# Patient Record
Sex: FEMALE | Race: WHITE | NOT HISPANIC OR LATINO | Employment: OTHER | URBAN - METROPOLITAN AREA
[De-identification: names, ages, dates, MRNs, and addresses within clinical notes are randomized per-mention and may not be internally consistent; named-entity substitution may affect disease eponyms.]

---

## 2023-07-26 ENCOUNTER — LAB REQUISITION (OUTPATIENT)
Dept: LAB | Facility: CLINIC | Age: 88
End: 2023-07-26

## 2023-07-26 DIAGNOSIS — E11.9 TYPE 2 DIABETES MELLITUS WITHOUT COMPLICATIONS (H): ICD-10-CM

## 2023-07-27 LAB
ANION GAP SERPL CALCULATED.3IONS-SCNC: 9 MMOL/L (ref 7–15)
BUN SERPL-MCNC: 11.7 MG/DL (ref 8–23)
CALCIUM SERPL-MCNC: 9 MG/DL (ref 8.2–9.6)
CHLORIDE SERPL-SCNC: 102 MMOL/L (ref 98–107)
CREAT SERPL-MCNC: 0.81 MG/DL (ref 0.51–0.95)
DEPRECATED HCO3 PLAS-SCNC: 28 MMOL/L (ref 22–29)
GFR SERPL CREATININE-BSD FRML MDRD: 68 ML/MIN/1.73M2
GLUCOSE SERPL-MCNC: 143 MG/DL (ref 70–99)
HBA1C MFR BLD: 8 %
POTASSIUM SERPL-SCNC: 4.7 MMOL/L (ref 3.4–5.3)
SODIUM SERPL-SCNC: 139 MMOL/L (ref 136–145)

## 2023-07-27 PROCEDURE — 83036 HEMOGLOBIN GLYCOSYLATED A1C: CPT | Performed by: FAMILY MEDICINE

## 2023-07-27 PROCEDURE — 82310 ASSAY OF CALCIUM: CPT | Performed by: FAMILY MEDICINE

## 2023-07-27 PROCEDURE — 36415 COLL VENOUS BLD VENIPUNCTURE: CPT | Performed by: FAMILY MEDICINE

## 2023-07-27 PROCEDURE — P9603 ONE-WAY ALLOW PRORATED MILES: HCPCS | Performed by: FAMILY MEDICINE

## 2024-07-16 ENCOUNTER — DOCUMENTATION ONLY (OUTPATIENT)
Dept: OTHER | Facility: CLINIC | Age: 89
End: 2024-07-16
Payer: MEDICARE

## 2024-07-17 ENCOUNTER — DOCUMENTATION ONLY (OUTPATIENT)
Dept: GERIATRICS | Facility: CLINIC | Age: 89
End: 2024-07-17
Payer: MEDICARE

## 2024-07-23 ENCOUNTER — ASSISTED LIVING VISIT (OUTPATIENT)
Dept: GERIATRICS | Facility: CLINIC | Age: 89
End: 2024-07-23
Payer: MEDICARE

## 2024-07-23 VITALS
TEMPERATURE: 96.8 F | HEART RATE: 76 BPM | WEIGHT: 110 LBS | DIASTOLIC BLOOD PRESSURE: 60 MMHG | OXYGEN SATURATION: 95 % | RESPIRATION RATE: 18 BRPM | SYSTOLIC BLOOD PRESSURE: 135 MMHG

## 2024-07-23 DIAGNOSIS — R60.0 PERIPHERAL EDEMA: ICD-10-CM

## 2024-07-23 DIAGNOSIS — E11.319 TYPE 2 DIABETES MELLITUS WITH RETINOPATHY, WITHOUT LONG-TERM CURRENT USE OF INSULIN, MACULAR EDEMA PRESENCE UNSPECIFIED, UNSPECIFIED LATERALITY, UNSPECIFIED RETINOPATHY SEVERITY (H): ICD-10-CM

## 2024-07-23 DIAGNOSIS — I50.32 CHRONIC DIASTOLIC CONGESTIVE HEART FAILURE (H): Primary | ICD-10-CM

## 2024-07-23 DIAGNOSIS — L89.312 PRESSURE INJURY OF RIGHT BUTTOCK, STAGE 2 (H): ICD-10-CM

## 2024-07-23 DIAGNOSIS — F22 DELUSIONAL DISORDERS (H): ICD-10-CM

## 2024-07-23 DIAGNOSIS — R23.9 ALTERATION IN SKIN INTEGRITY DUE TO MOISTURE: ICD-10-CM

## 2024-07-23 DIAGNOSIS — F03.918 DEMENTIA WITH BEHAVIORAL DISTURBANCE (H): ICD-10-CM

## 2024-07-23 DIAGNOSIS — R52 GENERALIZED PAIN: ICD-10-CM

## 2024-07-23 DIAGNOSIS — H91.90 PROFOUND HEARING LOSS: ICD-10-CM

## 2024-07-23 DIAGNOSIS — I25.85 CHRONIC CORONARY MICROVASCULAR DYSFUNCTION: ICD-10-CM

## 2024-07-23 DIAGNOSIS — K59.01 SLOW TRANSIT CONSTIPATION: ICD-10-CM

## 2024-07-23 DIAGNOSIS — R29.818 NEUROLOGICAL DEFICIT PRESENT: ICD-10-CM

## 2024-07-23 DIAGNOSIS — E55.9 VITAMIN D3 DEFICIENCY: ICD-10-CM

## 2024-07-23 PROCEDURE — 99344 HOME/RES VST NEW MOD MDM 60: CPT | Performed by: NURSE PRACTITIONER

## 2024-07-23 RX ORDER — FUROSEMIDE 20 MG
20 TABLET ORAL DAILY
COMMUNITY
Start: 2024-04-30 | End: 2024-07-23

## 2024-07-23 RX ORDER — POLYETHYLENE GLYCOL 3350 17 G/17G
17 POWDER, FOR SOLUTION ORAL PRN
Status: SHIPPED
Start: 2024-07-23 | End: 2024-07-23

## 2024-07-23 RX ORDER — POLYETHYLENE GLYCOL 3350 17 G/17G
17 POWDER, FOR SOLUTION ORAL PRN
COMMUNITY
Start: 2024-06-04 | End: 2024-07-23

## 2024-07-23 RX ORDER — RISPERIDONE 1 MG/1
1 TABLET, ORALLY DISINTEGRATING ORAL AT BEDTIME
COMMUNITY
Start: 2024-07-10

## 2024-07-23 RX ORDER — CHOLECALCIFEROL (VITAMIN D3) 50 MCG
1 TABLET ORAL DAILY
Qty: 30 TABLET | Refills: 11 | Status: SHIPPED | OUTPATIENT
Start: 2024-07-23 | End: 2024-07-29

## 2024-07-23 RX ORDER — M-VIT,TX,IRON,MINS/CALC/FOLIC 27MG-0.4MG
1 TABLET ORAL DAILY
COMMUNITY
Start: 2024-04-30

## 2024-07-23 RX ORDER — FUROSEMIDE 20 MG
20 TABLET ORAL DAILY PRN
Status: SHIPPED
Start: 2024-06-04 | End: 2024-07-23

## 2024-07-23 RX ORDER — ASPIRIN 81 MG/1
81 TABLET, CHEWABLE ORAL DAILY
COMMUNITY
Start: 2024-04-30

## 2024-07-23 RX ORDER — ACETAMINOPHEN 500 MG
1000 TABLET ORAL PRN
COMMUNITY
Start: 2024-04-30 | End: 2024-07-23

## 2024-07-23 RX ORDER — AMOXICILLIN 250 MG
1 CAPSULE ORAL DAILY PRN
COMMUNITY
Start: 2024-06-04

## 2024-07-23 RX ORDER — OLANZAPINE 5 MG/1
1 TABLET, ORALLY DISINTEGRATING ORAL EVERY 6 HOURS PRN
COMMUNITY
Start: 2024-04-30

## 2024-07-23 RX ORDER — ACETAMINOPHEN 500 MG
1000 TABLET ORAL EVERY 6 HOURS PRN
Status: SHIPPED
Start: 2024-05-10

## 2024-07-23 RX ORDER — TRAZODONE HYDROCHLORIDE 50 MG/1
12.5 TABLET, FILM COATED ORAL PRN
COMMUNITY
Start: 2024-04-30

## 2024-07-23 RX ORDER — POLYETHYLENE GLYCOL 3350 17 G/17G
1 POWDER, FOR SOLUTION ORAL DAILY PRN
Status: SHIPPED
Start: 2024-06-04

## 2024-07-23 NOTE — PROGRESS NOTES
[facility] GERIATRICS  INITIAL VISIT NOTE  July 23, 2024      PRIMARY CARE PROVIDER AND CLINIC:  Magalis Prince [address] [facility] [address] [facility] [place] [address]    [facility] Medical Record Number:  [id number]  Place of Service where encounter took place:  [facility]) [[id number]]    Chief Complaint   Patient presents with    Establish Care       HPI:    Carmela Freeman is a 89 year old  (4/25/1932) female was admitted to the above facility from   Home .  Most recent ED visit was on 5/12/24 for community acquired pneumonia.  Now resides at this facility for  medical management and nursing care.      History obtained from: facility chart records, facility staff, patient report, [facility] chart review, Care Everywhere Epic chart review, and family/first contact 2 of daughter's report.      Carmela is a 89 year old female with chronic health issues that include:  Type 2 DM with eye complications, chronic coronary microvascular dysfunction, GERD, Paranoia/Delusions, Profound hearing loss, Dementia with behavioral disturbances.    Carmela resides in [facility] since March of 2024.  Prior to that she lived in a house in [place] for 40+ years.   and has 6 children scattered around the [place].  Family made decision to turn over her primary care to in house provider as Carmela has been showing a decline in her health over last few weeks and hard to get her to be seen in a clinic.    Typically her primary provider was through [facility] and Carmela use to go to [facility].  Now she would go to [facility].      Meeting with Carmela for the first time today.  One of her daughter's from [facility] just came yesterday to spend time with her mother.  She states she can see a decline since being in [place] since April 2024.      What is most noticeable is that Carmela's left hand is folded in like it is contracted.  Does not hurt her until attempting to unfold the  "fingers.  Either bad arthritis or from a possible stroke that at this time in unknown.    Through this time, family and facility have added more services to her plan for her safety and need for being able to help with personal cares.  Has a 2WW that she has been noted to have since it is mentioned in the ED visit notes from May.  She was having weak knee and so they going to provider with her a walker.  Now with her left hand not able to grasp well, harder for her to walk with the walker.  This NP had given an order last week to initiate Home Care RN/OT/PT for mobility, exercises, muscle weakness, and skin alteration  on her buttocks.     Today the daughter assisted Carmela to a standing position while Carmela held onto the walker and this NP looked at her bottom.  Once done, then Carmela sat back down in the recliner.  Family state that Carmela has been sleeping in the recliner and so movement limited and would have a tendency to \"not have to go to the restroom\" if it was a male aide checking on her.  Family got Carmela a new adjustable bed and she actually slept for over 12 hours last night in bed.  The daughter present today thought her mom looked more rested and animated due to getting more sleep.      Carmela has profound hearing loss and the contact daughter states Carmela does not want to hear it is profound.  Have attempted different hearing aides, but something was not to Carmela's liking and so not used.  Needs to be spoken to clearly and near her.  She was able to answer 90% of the questions herself today for collecting history of self and family.  Attempted to tell her a lot today and so know that she probably was overwhelmed.    Carmela is a beautiful  by [employer].  Has pictures all around her apartment she painted.  Family hopes after all this, she will get back to painting.      After leaving the facility today, did call the primary contact daughter to introduce self, answer questions, ask questions, and get " family's thoughts on what they would want to do for Carmela and what Carmela would tolerate.          CODE STATUS/ADVANCE DIRECTIVES: DNR / DNI    ALLERGIES:  Allergies   Allergen Reactions    Atorvastatin      PN: LW Reaction: myalgias       PAST MEDICAL HISTORY:   Past Medical History:   Diagnosis Date    Acute coronary syndrome (H)     Diabetes mellitus, type 2 (H)     Gastroesophageal reflux disease     Hyperlipidemia     Paranoia (H)     Patellar tracking disorder of right knee        PAST SURGICAL HISTORY:   Past Surgical History:   Procedure Laterality Date    APPENDECTOMY      CATARACT EXTRACTION Bilateral     not done at the same time    fractured lower leg Right     adrianna placement    GALLBLADDER SURGERY      ruptured and cleaned inside of abdomen.  no colostomy       FAMILY HISTORY:   Family History   Problem Relation Age of Onset    Atrial fibrillation Mother     Pneumonia Father     Cancer Sister     Diabetes Daughter     Uterine Cancer Daughter     Leukemia Daughter        SOCIAL HISTORY:   Patient's living condition: lives in an assisted living facility    MEDICATIONS  Post Discharge Medication Reconciliation Status: patient was not discharged from an inpatient facility or TCU.  Current Outpatient Medications   Medication Sig Dispense Refill    acetaminophen (TYLENOL) 500 MG tablet Take 2 tablets (1,000 mg) by mouth every 6 hours as needed for fever or pain      aspirin (ASA) 81 MG chewable tablet Take 81 mg by mouth daily      OLANZapine zydis (ZYPREXA) 5 MG ODT Take 1 tablet by mouth every 6 hours as needed      polyethylene glycol (MIRALAX) 17 GM/Dose powder Take 17 g (1 Capful) by mouth daily as needed for constipation      risperiDONE (RISPERDAL M-TABS) 1 MG ODT Take 1 mg by mouth at bedtime      senna-docusate (SENOKOT-S/PERICOLACE) 8.6-50 MG tablet Take 1 tablet by mouth daily as needed      Therapeutic Multivit/Mineral tablet Take 1 tablet by mouth daily      traZODone (DESYREL) 50 MG tablet Take  "12.5 mg by mouth as needed      vitamin D3 (CHOLECALCIFEROL) 50 mcg (2000 units) tablet Take 1 tablet (50 mcg) by mouth daily 30 tablet 11    Barberry-Oreg Grape-Goldenseal 200-200-50 MG CAPS Take 1 capsule by mouth 2 times daily         ROS:  10 point ROS neg other than the symptoms noted above in the HPI.  Bowels moving, no pain with urination, no chest pain or SOB      PHYSICAL EXAM:  /60   Pulse 76   Temp 96.8  F (36  C)   Resp 18   Wt 49.9 kg (110 lb)   SpO2 95%   Physical Exam  Vitals and nursing note reviewed.   Constitutional:       Appearance: Normal appearance.      Comments: Thin has she has been losing weight with her altered diet.   HENT:      Head: Normocephalic and atraumatic.      Right Ear: External ear normal.      Left Ear: External ear normal.      Ears:      Comments: Profound hearing lose.  Speak by her and in clear slow voice     Nose: Nose normal.      Mouth/Throat:      Mouth: Mucous membranes are moist.      Pharynx: Oropharynx is clear.      Comments: Teeth in poor repair - eats pureed food that daughter makes for her.  Does not go downstairs for meals.    Eyes:      Extraocular Movements: Extraocular movements intact.      Conjunctiva/sclera: Conjunctivae normal.      Comments: No glasses worn.  She was able to tell this NP where to look for her paintings in her home.     Cardiovascular:      Rate and Rhythm: Normal rate and regular rhythm.      Heart sounds: Normal heart sounds.   Pulmonary:      Effort: Pulmonary effort is normal.      Breath sounds: Normal breath sounds.   Abdominal:      General: Abdomen is flat. Bowel sounds are normal.      Palpations: Abdomen is soft.   Genitourinary:     Comments: Can be incontinent if she says \"No\" when a male aide asks her if she needs to go or assist to restroom.  Poor hygiene in her rectal area.  Musculoskeletal:      Cervical back: Normal range of motion and neck supple.      Comments: Assist from daughter to hold her hands and " help her stand to walker.  Shaky.    No edema.    Left hand appears to be contracted.  Not able to fold out hand or it hurts.  Right hand normal   Skin:     General: Skin is warm and dry.      Coloration: Skin is pale.      Comments: Able to look at her bottom with help of the daughter to stand Carmela up.  On her right buttock there is approx. Nickel size stage 2 open area with a red base.  Does not appear to be infected.  Surround the same area on both buttocks is skin excoriation from friction and moisture.    Poor cleaning of self in rectal area.   Neurological:      Mental Status: She is alert.      Comments: Question left hand abnormality to be neurologic?  Oriented to place, person and time can be vague.  Did well with questions if she understood what was being said.   Psychiatric:      Comments: Tired but demeanor may be flat.    Hx of talking to self.    Has had 2 psych hospital stays at [facility] and [facility]      LABORATORY/IMAGING DATA:  Reviewed as per [facility] and/or [facility]    Vitamin D, 25-OH, Total  30 - 80 ng/mL 94 High      Lab Results   Component Value Date    A1C 8.0 07/27/2023     Component  Ref Range & Units 3 mo ago   Hemoglobin A1C  <=5.6 % 7.2 High    Estimated Average Glucose (Calc)  < 117 mg/dL 160     ASSESSMENT/PLAN:  (I50.32) Chronic diastolic congestive heart failure (H)  (primary encounter diagnosis)  (R60.0) Peripheral edema  Comment: currently is taking Lasix 20mg daily for the edema.  Minimal edema if any present today.  Lungs clear.  Family worries that she does not drink enough.  Next Monday will have a CMP done to check hydration.  No change with the Lasix dose today.  Plan: DISCONTINUED: furosemide (LASIX) 20 MG tablet    (I25.85) Chronic coronary microvascular dysfunction  Comment: possible acute coronary syndrome but saw an image of her heart showing thick dense arterial calcifications dated 7/13/23.    Remains on ASA 81mg daily.  No statin.  She is over 89 years old and so  most likely would not be effective.    Will collect a CBC next Monday.  Has had a TSH drawn 3 months ago and normal.    (R29.818) Neurological deficit present  Comment: Spoke with Carmela's primary contact daughter in regards to her left hand appearance.  Simple things would be osteoarthritis or a neurological event occurred but has not been evaluated for it at this time.  Minimally will just get a x-ray to make sure there is no fractures in her hand due to anticipation of therapies working with her in the future.  Did see she had a wrist splint laying on a table near her, but did not ask about it.  Did speak about possibly getting a CT scan to see if there is a history of having a stroke that may have caused her hand to crawl like it did.  Family is not so sure they would put her through that due to her mental illness.  Would leave it up to them to decide and could easily set up a scan over at [facility].  Daughter also stated that historically she has had issues with her hands where they would curl like that and then she would have to go in and get treatment to release the tension and then it would go back to normal.  Family is not so sure that is the issue at this time, but can address it in the future if they would want to take her over to orthopedics    (E11.319) Type 2 diabetes mellitus with retinopathy, without long-term current use of insulin, macular edema presence unspecified, unspecified laterality, unspecified retinopathy severity (H)  Comment: Carmela does not have any medications at this time for diabetes.  No blood sugar checks.  Will order A1c for next Monday since it was 3 to 4 months ago that she had a check.    (F22) Delusional disorders (H)  (F03.918) Dementia with behavioral disturbance (H)  Comment: Is under the care of a psychiatrist through [facility].  Have been slowly taking her down off of the scheduled risperidone.  Currently she is at 1 mg at bedtime.  Has as needed olanzapine  available for psychotic behaviors.  Daughter stated that Carmela at 1 point would be talking to herself.  Thinking that it had something to do with her hearing aids as well.  She did not like to hear her surroundings, and so does not wear her hearing aids.  Historically she would also have paranoid thoughts.  Has had 2 psych hospitalizations.  [facility] in [facility] a few years ago.  In speaking with the daughter this evening, Carmela is on the waiting list to go down into memory care where she will get more assistance and they would be able to accommodate her diet which is a puréed texture.  For now facility and family have increased her services for her safety.    (L89.312) Pressure injury of right buttock, stage 2 (H)  (R23.9) Alteration in skin integrity due to moisture  Comment: Today gave an order for Calmoseptine ointment twice a day and then twice a day as needed to be applied to her buttocks over the excoriated area and the open area.  Home care referral has already been put in place on an RN to evaluate and treat.  Did express to the nurses that Carmela needs more assistance with [employer] and cleanliness.  Now that she is laying in the bed, hopefully she can offload some pressure to her buttocks.    (H91.90) Profound hearing loss  Comment: Her hearing is a big barrier to her functioning in the environment.  Family states that because of this she does self isolate.  Informed them when she does get to go down to memory care, she would be able to stay in her room in between meals or try to participate in the activities visually.  Carmela did very well today and answering questions as far as family history her past surgeries and in general about her family.  She has a history of having multiple hearing aids which not for panned out to her liking.  No hearing aids are worn at this time.    (R52) Generalized pain  Comment: Just updating her epic medication list to reflect that she does have Tylenol extra  strength 1000 mg every 6 hours as needed.  Overall she states she has no pain at rest.  Plan: acetaminophen (TYLENOL) 500 MG tablet    (K59.01) Slow transit constipation  Comment: Carmela does not complain of any constipation but did make her aware that she does have senna S and MiraLAX on her list that she can ask for if she has any problems  Plan: polyethylene glycol (MIRALAX) 17 GM/Dose         powder, DISCONTINUED: polyethylene glycol         (MIRALAX) 17 GM/Dose powder    (E55.9) Vitamin D3 deficiency  Comment: Searched up vitamin D lab to see if any person ever ordered this lab.  Did found the last one that was taken fairly recent show that her reading was in the 90s which could lead to toxicity.  Carmela receives 5000 units on a daily basis.  Sent a new prescription to the pharmacy to reduce her vitamin D to 2000 units daily.  Next Monday will have vitamin D level drawn for comparison to her previous lab value.  Plan: vitamin D3 (CHOLECALCIFEROL) 50 mcg (2000         units) tablet     Orders:    Discontinue Vitamin D3 5000 units daily due to toxic level with last blood draw  Start Vitamin D3 2000 units po daily for vitamin D def.  X-ray of left hand, 2 views due to contracture appearance.    Next Monday please draw:  CBC, CMP, Vitamin D level, A1c for Type 2 DM, coronary microvascular dysfunction, Vitamin D def, and peripheral edema.  UA/UC due to incontinence, tiredness.    Total time with a new patient visit in the assisted livin minutes including discussions about the POC and care coordination with the patient, family, 2 of her daughters, and caregiver. Greater than 50% of total time spent with counseling and coordinating care due to gathering information, reviewing medications, goals of care and explanation of how the NP works in the residential setting    Electronically signed by:  STUART Alexis CNP          Documentation of Face-to-Face and Certification for Home Health Services     Patient:  Carmela Freeman   YOB: 1932  MR Number: [id number]  Today's Date: 7/23/2024    I certify that patient: Carmela Freeman is under my care and that I, or a nurse practitioner or physician's assistant working with me, had a face-to-face encounter that meets the physician face-to-face encounter requirements with this patient on: 7/23/2024.    This encounter with the patient was in whole, or in part, for the following medical condition, which is the primary reason for home health care: pressure sore stage 2 on buttocks, general muscle weakness, impaired mobility and ADLs, Neurological deficits.    I certify that, based on my findings, the following services are medically necessary home health services: Nursing, Occupational Therapy, and Physical Therapy.    My clinical findings support the need for the above services because: Nurse is needed: To assess vitals, cardiac and respiratory status after changes in medications or other medical regimen. and evaluate and treat skin alteration on buttocks due to friction, moisture, pressure., Occupational Therapy Services are needed to assess and treat cognitive ability and address ADL safety due to impairment in left hand, upper body strength., and Physical Therapy Services are needed to assess and treat the following functional impairments: mobility, ambulation, transfers, endurance.    Further, I certify that my clinical findings support that this patient is homebound (i.e. absences from home require considerable and taxing effort and are for medical reasons or Judaism services or infrequently or of short duration when for other reasons) because: Requires assistance of another person or specialized equipment to access medical services because patient: Is unable to exit home safely on own due to: unsteady gait/changes with left hand as appears like a contracture. and Requires supervision of another for safe transfer...    Based on the above findings. I certify  that this patient is confined to the home and needs intermittent skilled nursing care, physical therapy and/or speech therapy.  The patient is under my care, and I have initiated the establishment of the plan of care.  This patient will be followed by a physician who will periodically review the plan of care.  Physician/Provider to provide follow up care: Magalis Prince    Responsible Medicare certified PEC[employer] Physician:  Aysha Davis MD  Physician Signature:    Dr. Aysha Davis MD  Date: 7/23/2024

## 2024-07-23 NOTE — LETTER
7/23/2024      Carmela Freeman  [address] [address]        No notes on file      Sincerely,        STUART Alexis CNP

## 2024-07-24 ENCOUNTER — LAB REQUISITION (OUTPATIENT)
Dept: LAB | Facility: CLINIC | Age: 89
End: 2024-07-24
Payer: MEDICARE

## 2024-07-24 DIAGNOSIS — R60.9 EDEMA, UNSPECIFIED: ICD-10-CM

## 2024-07-24 DIAGNOSIS — R32 UNSPECIFIED URINARY INCONTINENCE: ICD-10-CM

## 2024-07-24 DIAGNOSIS — E55.9 VITAMIN D DEFICIENCY, UNSPECIFIED: ICD-10-CM

## 2024-07-24 LAB
ALBUMIN UR-MCNC: NEGATIVE MG/DL
APPEARANCE UR: CLEAR
BACTERIA #/AREA URNS HPF: ABNORMAL /HPF
BILIRUB UR QL STRIP: NEGATIVE
COLOR UR AUTO: ABNORMAL
GLUCOSE UR STRIP-MCNC: NEGATIVE MG/DL
HGB UR QL STRIP: NEGATIVE
KETONES UR STRIP-MCNC: NEGATIVE MG/DL
LEUKOCYTE ESTERASE UR QL STRIP: ABNORMAL
NITRATE UR QL: NEGATIVE
PH UR STRIP: 6.5 [PH] (ref 5–7)
RBC URINE: 0 /HPF
SP GR UR STRIP: 1.01 (ref 1–1.03)
SQUAMOUS EPITHELIAL: 2 /HPF
TRANSITIONAL EPI: <1 /HPF
UROBILINOGEN UR STRIP-MCNC: NORMAL MG/DL
WBC URINE: 7 /HPF

## 2024-07-24 PROCEDURE — 81001 URINALYSIS AUTO W/SCOPE: CPT | Mod: ORL | Performed by: NURSE PRACTITIONER

## 2024-07-24 NOTE — PROGRESS NOTES
New orders for Carmela Freeman;     Discontinue Vitamin D3 5000 units daily due to toxic level with last blood draw  Start Vitamin D3 2000 units po daily for vitamin D def.  X-ray of left hand, 2 views due to contracture appearance.    Next Monday please draw:  CBC, CMP, Vitamin D level, A1c for Type 2 DM, coronary microvascular dysfunction, Vitamin D def, and peripheral edema.  UA/UC due to incontinence, tiredness.      Magalis Prince, APRN CNP  7/23/24

## 2024-07-29 DIAGNOSIS — E55.9 VITAMIN D3 DEFICIENCY: ICD-10-CM

## 2024-07-29 LAB
ALBUMIN SERPL BCG-MCNC: 3.8 G/DL (ref 3.5–5.2)
ALP SERPL-CCNC: 49 U/L (ref 40–150)
ALT SERPL W P-5'-P-CCNC: 14 U/L (ref 0–50)
ANION GAP SERPL CALCULATED.3IONS-SCNC: 12 MMOL/L (ref 7–15)
AST SERPL W P-5'-P-CCNC: 32 U/L (ref 0–45)
BILIRUB SERPL-MCNC: 0.6 MG/DL
BUN SERPL-MCNC: 18.9 MG/DL (ref 8–23)
CALCIUM SERPL-MCNC: 9.4 MG/DL (ref 8.8–10.4)
CHLORIDE SERPL-SCNC: 96 MMOL/L (ref 98–107)
CREAT SERPL-MCNC: 1.12 MG/DL (ref 0.51–0.95)
EGFRCR SERPLBLD CKD-EPI 2021: 46 ML/MIN/1.73M2
ERYTHROCYTE [DISTWIDTH] IN BLOOD BY AUTOMATED COUNT: 13.5 % (ref 10–15)
GLUCOSE SERPL-MCNC: 185 MG/DL (ref 70–99)
HBA1C MFR BLD: 7.3 %
HCO3 SERPL-SCNC: 31 MMOL/L (ref 22–29)
HCT VFR BLD AUTO: 35 % (ref 35–47)
HGB BLD-MCNC: 11.5 G/DL (ref 11.7–15.7)
MCH RBC QN AUTO: 29.9 PG (ref 26.5–33)
MCHC RBC AUTO-ENTMCNC: 32.9 G/DL (ref 31.5–36.5)
MCV RBC AUTO: 91 FL (ref 78–100)
PLATELET # BLD AUTO: 290 10E3/UL (ref 150–450)
POTASSIUM SERPL-SCNC: 3.5 MMOL/L (ref 3.4–5.3)
PROT SERPL-MCNC: 6.5 G/DL (ref 6.4–8.3)
RBC # BLD AUTO: 3.85 10E6/UL (ref 3.8–5.2)
SODIUM SERPL-SCNC: 139 MMOL/L (ref 135–145)
VIT D+METAB SERPL-MCNC: 96 NG/ML (ref 20–50)
WBC # BLD AUTO: 5.1 10E3/UL (ref 4–11)

## 2024-07-29 PROCEDURE — P9604 ONE-WAY ALLOW PRORATED TRIP: HCPCS | Mod: ORL | Performed by: NURSE PRACTITIONER

## 2024-07-29 PROCEDURE — 36415 COLL VENOUS BLD VENIPUNCTURE: CPT | Mod: ORL | Performed by: NURSE PRACTITIONER

## 2024-07-29 PROCEDURE — 80053 COMPREHEN METABOLIC PANEL: CPT | Mod: ORL | Performed by: NURSE PRACTITIONER

## 2024-07-29 PROCEDURE — 82306 VITAMIN D 25 HYDROXY: CPT | Mod: ORL | Performed by: NURSE PRACTITIONER

## 2024-07-29 PROCEDURE — 85027 COMPLETE CBC AUTOMATED: CPT | Mod: ORL | Performed by: NURSE PRACTITIONER

## 2024-07-29 PROCEDURE — 83036 HEMOGLOBIN GLYCOSYLATED A1C: CPT | Mod: ORL | Performed by: NURSE PRACTITIONER

## 2024-07-29 RX ORDER — CHOLECALCIFEROL (VITAMIN D3) 50 MCG
TABLET ORAL
Qty: 31 TABLET | Refills: 11 | Status: SHIPPED | OUTPATIENT
Start: 2024-07-29 | End: 2024-08-14

## 2024-08-04 DIAGNOSIS — R23.8 SKIN IRRITATION: Primary | ICD-10-CM

## 2024-08-05 ENCOUNTER — ASSISTED LIVING VISIT (OUTPATIENT)
Dept: GERIATRICS | Facility: CLINIC | Age: 89
End: 2024-08-05
Payer: MEDICARE

## 2024-08-05 VITALS
DIASTOLIC BLOOD PRESSURE: 60 MMHG | SYSTOLIC BLOOD PRESSURE: 135 MMHG | TEMPERATURE: 97.8 F | WEIGHT: 110 LBS | HEART RATE: 76 BPM | RESPIRATION RATE: 18 BRPM

## 2024-08-05 DIAGNOSIS — I50.32 CHRONIC DIASTOLIC CONGESTIVE HEART FAILURE (H): Primary | ICD-10-CM

## 2024-08-05 DIAGNOSIS — E11.319 TYPE 2 DIABETES MELLITUS WITH RETINOPATHY, WITHOUT LONG-TERM CURRENT USE OF INSULIN, MACULAR EDEMA PRESENCE UNSPECIFIED, UNSPECIFIED LATERALITY, UNSPECIFIED RETINOPATHY SEVERITY (H): ICD-10-CM

## 2024-08-05 DIAGNOSIS — E55.9 VITAMIN D3 DEFICIENCY: ICD-10-CM

## 2024-08-05 DIAGNOSIS — R60.0 PERIPHERAL EDEMA: ICD-10-CM

## 2024-08-05 PROCEDURE — 99349 HOME/RES VST EST MOD MDM 40: CPT | Performed by: NURSE PRACTITIONER

## 2024-08-05 RX ORDER — ANORECTAL OINTMENT 15.7; .44; 24; 20.6 G/100G; G/100G; G/100G; G/100G
OINTMENT TOPICAL
Qty: 113 G | Refills: 10 | Status: SHIPPED | OUTPATIENT
Start: 2024-08-05 | End: 2024-09-11

## 2024-08-05 NOTE — LETTER
8/5/2024      Carmela Freeman  [address] [facility] [address]        [facility] GERIATRICS  ACUTE/EPISODIC VISIT    [facility] Medical Record Number:  [id number]  Place of Service where encounter took place:  [facility]) [[id number]]    Chief Complaint   Patient presents with     RECHECK       HPI:    Carmela Freeman is a 89 year old  (4/25/1932), who is being seen today for an episodic care visit.  HPI information obtained from: facility chart records, facility staff, patient report, and [facility] chart review.    Today's concern is:    Diagnoses         Codes Comments    Chronic diastolic congestive heart failure (H)    -  Primary I50.32     Peripheral edema     R60.0     Vitamin D3 deficiency     E55.9     Type 2 diabetes mellitus with retinopathy, without long-term current use of insulin, macular edema presence unspecified, unspecified laterality, unspecified retinopathy severity (H)     E11.319           Came to see Carmela today.  Apartment locked and so had nursing come let this NP in to see Carmela.  Carmela will not answer the door.  Has hearing loss and so may not even hear the knocking.    Carmela lives in the AL side of the building but needs a lot of help.  Family knows she needs more cares and waiting for a proper bed in memory care to open up.  Currently daughter brings her all her meals as they have to be prepared differently than just regular texture easy swallowing.      Pulled a chair up to Carmela and spoke loudly for her and she seemed to answer questions appropriately.  Has the walker in front of her and her legs are elevated with the recliner.  Needing more assist with personal cares and mobility.  Carmela stated she would have staff help her.  Not sure if she would remember to push her pendent for help.      Reviewed labs with her and informed her about the Vitamin D level that it was toxic and so despite lowering the dose already, today will discontinue the  medication all together.    ALLERGIES:    Allergies   Allergen Reactions     Atorvastatin      PN: LW Reaction: myalgias        MEDICATIONS:  Post Discharge Medication Reconciliation Status: patient was not discharged from an inpatient facility or TCU.     Current Outpatient Medications   Medication Sig Dispense Refill     acetaminophen (TYLENOL) 500 MG tablet Take 2 tablets (1,000 mg) by mouth every 6 hours as needed for fever or pain       aspirin (ASA) 81 MG chewable tablet Take 81 mg by mouth daily       Barberry-Oreg Grape-Goldenseal 200-200-50 MG CAPS Take 1 capsule by mouth 2 times daily       CALMOSEPTINE 0.44-20.6 % OINT ointment APPLY TOPICALLY BUTTOCKS 2 TIMES DAILY (DX: SKIN EXCORIATION);APPLY TOPICALLY BUTTOCKS 2 TIMES DAILY AS NEEDED (DX: SKIN EXCORIATION) 113 g 10     OLANZapine zydis (ZYPREXA) 5 MG ODT Take 1 tablet by mouth every 6 hours as needed       polyethylene glycol (MIRALAX) 17 GM/Dose powder Take 17 g (1 Capful) by mouth daily as needed for constipation       risperiDONE (RISPERDAL M-TABS) 1 MG ODT Take 1 mg by mouth at bedtime       senna-docusate (SENOKOT-S/PERICOLACE) 8.6-50 MG tablet Take 1 tablet by mouth daily as needed       Therapeutic Multivit/Mineral tablet Take 1 tablet by mouth daily       traZODone (DESYREL) 50 MG tablet Take 12.5 mg by mouth as needed         REVIEW OF SYSTEMS:  4 point ROS neg other than the symptoms noted above in the HPI.  Denied chest pain, SOB, no stomach discomfort.      PHYSICAL EXAM:  /60   Pulse 76   Temp 97.8  F (36.6  C)   Resp 18   Wt 49.9 kg (110 lb)   Carmela is sitting in her recliner.  No acute distress.  Makes eye contact when spoken too.  Smiles appropriately.  Skin is pale, dry and warm.  Did not stand her up to look at her bottom skin integrity as felt she may be to unsteady.  Left hand continues to be contracted.  Can move all other extremities fine and ok with moving left shoulder.    Lungs are clear.    Heart rate regular and  strong.  Trace edema.  Abdomen is soft, non-tender.  Bowel sounds present.      Component      Latest Ref Rng 7/29/2024  9:32 AM   Sodium      135 - 145 mmol/L 139    Potassium      3.4 - 5.3 mmol/L 3.5    Carbon Dioxide (CO2)      22 - 29 mmol/L 31 (H)    Anion Gap      7 - 15 mmol/L 12    Urea Nitrogen      8.0 - 23.0 mg/dL 18.9    Creatinine      0.51 - 0.95 mg/dL 1.12 (H)    GFR Estimate      >60 mL/min/1.73m2 46 (L)    Calcium      8.8 - 10.4 mg/dL 9.4    Chloride      98 - 107 mmol/L 96 (L)    Alkaline Phosphatase      40 - 150 U/L 49    AST      0 - 45 U/L 32    ALT      0 - 50 U/L 14    Protein Total      6.4 - 8.3 g/dL 6.5    Albumin      3.5 - 5.2 g/dL 3.8    Bilirubin Total      <=1.2 mg/dL 0.6    WBC      4.0 - 11.0 10e3/uL 5.1    RBC Count      3.80 - 5.20 10e6/uL 3.85    Hemoglobin      11.7 - 15.7 g/dL 11.5 (L)    Hematocrit      35.0 - 47.0 % 35.0    MCV      78 - 100 fL 91    MCH      26.5 - 33.0 pg 29.9    MCHC      31.5 - 36.5 g/dL 32.9    RDW      10.0 - 15.0 % 13.5    Platelet Count      150 - 450 10e3/uL 290    Hemoglobin A1C      <5.7 % 7.3 (H)    Vitamin D, Total (25-Hydroxy)      20 - 50 ng/mL 96 (H)    Glucose      70 - 99 mg/dL 185 (H)        ASSESSMENT / PLAN:  (I50.32) Chronic diastolic congestive heart failure (H)  (primary encounter diagnosis)  (R60.0) Peripheral edema  Comment: continues with no symptoms.  BMP was stable.  Remains on Lasix 20mg po daily.  No changes.      (E55.9) Vitamin D3 deficiency  Comment: today will discontinue the D2 50mcg daily and recheck a vitamin D level in two months.    (E11.319) Type 2 diabetes mellitus with retinopathy, without long-term current use of insulin, macular edema presence unspecified, unspecified laterality, unspecified retinopathy severity (H)  Comment: no sugars are followed and no medications.  7.3% for A1c acceptable.  Will give orders in future when near or past 3 months.     Orders:  Discontinue Vitamin D tab  Vitamin D level in 2  months for Vitamin D def.    Electronically signed by  STUART Alexis CNP           Sincerely,        STUART Alexis CNP

## 2024-08-05 NOTE — PROGRESS NOTES
[facility] GERIATRICS  ACUTE/EPISODIC VISIT    [facility] Medical Record Number:  [id number]  Place of Service where encounter took place:  [facility] ([facility]) [[id number]]    Chief Complaint   Patient presents with    RECHECK       HPI:    Carmela Freeman is a 89 year old  (4/25/1932), who is being seen today for an episodic care visit.  HPI information obtained from: facility chart records, facility staff, patient report, and [facility] chart review.    Today's concern is:    Diagnoses         Codes Comments    Chronic diastolic congestive heart failure (H)    -  Primary I50.32     Peripheral edema     R60.0     Vitamin D3 deficiency     E55.9     Type 2 diabetes mellitus with retinopathy, without long-term current use of insulin, macular edema presence unspecified, unspecified laterality, unspecified retinopathy severity (H)     E11.319           Came to see Carmela today.  Apartment locked and so had nursing come let this NP in to see Carmela.  Carmela will not answer the door.  Has hearing loss and so may not even hear the knocking.    Carmela lives in the AL side of the building but needs a lot of help.  Family knows she needs more cares and waiting for a proper bed in memory care to open up.  Currently daughter brings her all her meals as they have to be prepared differently than just regular texture easy swallowing.      Pulled a chair up to Carmela and spoke loudly for her and she seemed to answer questions appropriately.  Has the walker in front of her and her legs are elevated with the recliner.  Needing more assist with personal cares and mobility.  Carmela stated she would have staff help her.  Not sure if she would remember to push her pendent for help.      Reviewed labs with her and informed her about the Vitamin D level that it was toxic and so despite lowering the dose already, today will discontinue the medication all together.    ALLERGIES:    Allergies   Allergen Reactions     Atorvastatin      PN: LW Reaction: myalgias        MEDICATIONS:  Post Discharge Medication Reconciliation Status: patient was not discharged from an inpatient facility or TCU.     Current Outpatient Medications   Medication Sig Dispense Refill    acetaminophen (TYLENOL) 500 MG tablet Take 2 tablets (1,000 mg) by mouth every 6 hours as needed for fever or pain      aspirin (ASA) 81 MG chewable tablet Take 81 mg by mouth daily      Barberry-Oreg Grape-Goldenseal 200-200-50 MG CAPS Take 1 capsule by mouth 2 times daily      CALMOSEPTINE 0.44-20.6 % OINT ointment APPLY TOPICALLY BUTTOCKS 2 TIMES DAILY (DX: SKIN EXCORIATION);APPLY TOPICALLY BUTTOCKS 2 TIMES DAILY AS NEEDED (DX: SKIN EXCORIATION) 113 g 10    OLANZapine zydis (ZYPREXA) 5 MG ODT Take 1 tablet by mouth every 6 hours as needed      polyethylene glycol (MIRALAX) 17 GM/Dose powder Take 17 g (1 Capful) by mouth daily as needed for constipation      risperiDONE (RISPERDAL M-TABS) 1 MG ODT Take 1 mg by mouth at bedtime      senna-docusate (SENOKOT-S/PERICOLACE) 8.6-50 MG tablet Take 1 tablet by mouth daily as needed      Therapeutic Multivit/Mineral tablet Take 1 tablet by mouth daily      traZODone (DESYREL) 50 MG tablet Take 12.5 mg by mouth as needed         REVIEW OF SYSTEMS:  4 point ROS neg other than the symptoms noted above in the HPI.  Denied chest pain, SOB, no stomach discomfort.      PHYSICAL EXAM:  /60   Pulse 76   Temp 97.8  F (36.6  C)   Resp 18   Wt 49.9 kg (110 lb)   Carmela is sitting in her recliner.  No acute distress.  Makes eye contact when spoken too.  Smiles appropriately.  Skin is pale, dry and warm.  Did not stand her up to look at her bottom skin integrity as felt she may be to unsteady.  Left hand continues to be contracted.  Can move all other extremities fine and ok with moving left shoulder.    Lungs are clear.    Heart rate regular and strong.  Trace edema.  Abdomen is soft, non-tender.  Bowel sounds present.       Component      Latest Ref Rng 7/29/2024  9:32 AM   Sodium      135 - 145 mmol/L 139    Potassium      3.4 - 5.3 mmol/L 3.5    Carbon Dioxide (CO2)      22 - 29 mmol/L 31 (H)    Anion Gap      7 - 15 mmol/L 12    Urea Nitrogen      8.0 - 23.0 mg/dL 18.9    Creatinine      0.51 - 0.95 mg/dL 1.12 (H)    GFR Estimate      >60 mL/min/1.73m2 46 (L)    Calcium      8.8 - 10.4 mg/dL 9.4    Chloride      98 - 107 mmol/L 96 (L)    Alkaline Phosphatase      40 - 150 U/L 49    AST      0 - 45 U/L 32    ALT      0 - 50 U/L 14    Protein Total      6.4 - 8.3 g/dL 6.5    Albumin      3.5 - 5.2 g/dL 3.8    Bilirubin Total      <=1.2 mg/dL 0.6    WBC      4.0 - 11.0 10e3/uL 5.1    RBC Count      3.80 - 5.20 10e6/uL 3.85    Hemoglobin      11.7 - 15.7 g/dL 11.5 (L)    Hematocrit      35.0 - 47.0 % 35.0    MCV      78 - 100 fL 91    MCH      26.5 - 33.0 pg 29.9    MCHC      31.5 - 36.5 g/dL 32.9    RDW      10.0 - 15.0 % 13.5    Platelet Count      150 - 450 10e3/uL 290    Hemoglobin A1C      <5.7 % 7.3 (H)    Vitamin D, Total (25-Hydroxy)      20 - 50 ng/mL 96 (H)    Glucose      70 - 99 mg/dL 185 (H)        ASSESSMENT / PLAN:  (I50.32) Chronic diastolic congestive heart failure (H)  (primary encounter diagnosis)  (R60.0) Peripheral edema  Comment: continues with no symptoms.  BMP was stable.  Remains on Lasix 20mg po daily.  No changes.      (E55.9) Vitamin D3 deficiency  Comment: today will discontinue the D2 50mcg daily and recheck a vitamin D level in two months.    (E11.319) Type 2 diabetes mellitus with retinopathy, without long-term current use of insulin, macular edema presence unspecified, unspecified laterality, unspecified retinopathy severity (H)  Comment: no sugars are followed and no medications.  7.3% for A1c acceptable.  Will give orders in future when near or past 3 months.     Orders:  Discontinue Vitamin D tab  Vitamin D level in 2 months for Vitamin D def.    Electronically signed by  Magalis Prince,  APRN CNP

## 2024-08-13 DIAGNOSIS — Z53.9 DIAGNOSIS NOT YET DEFINED: Primary | ICD-10-CM

## 2024-08-13 PROCEDURE — G0180 MD CERTIFICATION HHA PATIENT: HCPCS | Performed by: NURSE PRACTITIONER

## 2024-08-31 VITALS
DIASTOLIC BLOOD PRESSURE: 60 MMHG | SYSTOLIC BLOOD PRESSURE: 135 MMHG | HEART RATE: 76 BPM | OXYGEN SATURATION: 95 % | RESPIRATION RATE: 18 BRPM | WEIGHT: 110 LBS

## 2024-08-31 NOTE — PROGRESS NOTES
[facility] GERIATRICS  ACUTE/EPISODIC VISIT    [facility] Medical Record Number:  [id number]  Place of Service where encounter took place:  [facility] ([facility]) [[id number]]    Chief Complaint   Patient presents with    RECHECK       HPI:    Carmela Freeman is a 89 year old  (4/25/1932), who is being seen today for an episodic care visit.  HPI information obtained from: facility chart records, facility staff, patient report, [facility] chart review, and Care Everywhere Epic chart review.    Today's concern is:    Diagnoses         Codes Comments    Chronic diastolic congestive heart failure (H)    -  Primary I50.32     Chronic coronary microvascular dysfunction     I25.85     Peripheral edema     R60.0     Neurological deficit present     R29.818     Impaired mobility and activities of daily living     Z74.09, Z78.9     Alteration in skin integrity due to moisture     R23.9     Dementia with behavioral disturbance (H)     F03.918            Came to see Carmela today and how she has been doing.  Nursing went into the apartment with this NP as they have to open her door.  Found Carmela laying in her bed.  Already dressed.  She was willing to get up and go to the living room but she was worried about her toilet as it has been plugged.  In the time spent with her 3 people were going to call maintenance to come up to the apartment.  Did flush the toilet and it would not go donw.      Good to see Carmela ambulate.  Using the 2WW.  Staff assist with making she she could swing legs out of bed, to stand and then contact guard to the living room but she plops down in the chair.  Feel she is moving around much better than when first starting with Carmela.      Had a nice conversation with Carmela.  Was more talkative today.  One of her kids are coming in to stay with her for a few days and one just left within the past few days.      Asked her how her sore on her bottom is doing and she states that it has healed.   Home care should be wrapping up soon with her then.  Still have OT working with her and the left hand that has been contracted.  Carmela points it out that she has a hard time grasping the walker with the left hand completely.      States her visit is good but her hearing is very poor.  She will not wear hearing aides.  Did have this NP feel behind her ears as she feels there is something there but did not see anything and palpated both sides the same.    ALLERGIES:    Allergies   Allergen Reactions    Atorvastatin      PN: LW Reaction: myalgias        MEDICATIONS:  Post Discharge Medication Reconciliation Status: patient was not discharged from an inpatient facility or TCU.     Current Outpatient Medications   Medication Sig Dispense Refill    acetaminophen (TYLENOL) 500 MG tablet Take 2 tablets (1,000 mg) by mouth every 6 hours as needed for fever or pain      aspirin (ASA) 81 MG chewable tablet Take 81 mg by mouth daily      Barberry-Oreg Grape-Goldenseal 200-200-50 MG CAPS Take 1 capsule by mouth 2 times daily      CALMOSEPTINE 0.44-20.6 % OINT ointment APPLY TOPICALLY BUTTOCKS 2 TIMES DAILY (DX: SKIN EXCORIATION);APPLY TOPICALLY BUTTOCKS 2 TIMES DAILY AS NEEDED (DX: SKIN EXCORIATION) 113 g 10    OLANZapine zydis (ZYPREXA) 5 MG ODT Take 1 tablet by mouth every 6 hours as needed      polyethylene glycol (MIRALAX) 17 GM/Dose powder Take 17 g (1 Capful) by mouth daily as needed for constipation      risperiDONE (RISPERDAL M-TABS) 1 MG ODT Take 1 mg by mouth at bedtime      senna-docusate (SENOKOT-S/PERICOLACE) 8.6-50 MG tablet Take 1 tablet by mouth daily as needed      Therapeutic Multivit/Mineral tablet Take 1 tablet by mouth daily      traZODone (DESYREL) 50 MG tablet Take 12.5 mg by mouth as needed         REVIEW OF SYSTEMS:  4 point ROS neg other than the symptoms noted above in the HPI.  Denied chest pain, no SOB. Bowels moving fine.      PHYSICAL EXAM:  /60   Pulse 76   Resp 18   Wt 49.9 kg (110 lb)    SpO2 95%     Heart rate regular and strong.  No edema.  Lungs are clear.  Abdomen is flat/round, soft, and non-tender.  Bowel sounds present.  Skin is pale, warm and dry.  No open areas.    Neatly groomed.  Does not make a lot of eye contact but turned head when asked how her visit was doing.  Left hand is still contracted.  Able to move the arm.  Alert and oriented to person, place and time can be vague with exact details.    Labs done at end of July.    ASSESSMENT / PLAN:  (I50.32) Chronic diastolic congestive heart failure (H)  (primary encounter diagnosis)  (R60.0) Peripheral edema  Comment: remains on Lasix 20mg po daily.  No acute symptoms.  No edema.  Continue to monitor.  Encouraged her to keep drinking fluids.  Has a glass of water by her chair in the living room.    (I25.85) Chronic coronary microvascular dysfunction  Comment: continues with ASA 81mg po daily.        (R29.818) Neurological deficit present  (Z74.09,  Z78.9) Impaired mobility and activities of daily living  Comment: continues with left hand contraction with unknown cause.  Therapies continues to be working with her for adl's and mobility.  Impressed with how she is moving around today.  Appears to be more alert and engaged compared to first visit.      (R23.9) Alteration in skin integrity due to moisture  Comment: resolved but home care still monitoring as they are seeing her less.  Do know in their notes they encourage her to reposition to off load pressure and reviewed about friction.      (F03.918) Dementia with behavioral disturbance (H)  Comment: managing in apartment for now with more help from staff.  Daughter whom lives close brings her food as the texture is altered.  Is on risperidone 1mg at HS to help with her mood and sleep.  No changes.  Ongoing monitoring to see about if she should be staying in the apartment.  Family visits often.     Orders:  No new orders today    Electronically signed by  STUART Alexis CNP

## 2024-09-02 ENCOUNTER — ASSISTED LIVING VISIT (OUTPATIENT)
Dept: GERIATRICS | Facility: CLINIC | Age: 89
End: 2024-09-02
Payer: MEDICARE

## 2024-09-02 DIAGNOSIS — Z78.9 IMPAIRED MOBILITY AND ACTIVITIES OF DAILY LIVING: ICD-10-CM

## 2024-09-02 DIAGNOSIS — R29.818 NEUROLOGICAL DEFICIT PRESENT: ICD-10-CM

## 2024-09-02 DIAGNOSIS — I25.85 CHRONIC CORONARY MICROVASCULAR DYSFUNCTION: ICD-10-CM

## 2024-09-02 DIAGNOSIS — Z74.09 IMPAIRED MOBILITY AND ACTIVITIES OF DAILY LIVING: ICD-10-CM

## 2024-09-02 DIAGNOSIS — R23.9 ALTERATION IN SKIN INTEGRITY DUE TO MOISTURE: ICD-10-CM

## 2024-09-02 DIAGNOSIS — R60.0 PERIPHERAL EDEMA: ICD-10-CM

## 2024-09-02 DIAGNOSIS — I50.32 CHRONIC DIASTOLIC CONGESTIVE HEART FAILURE (H): Primary | ICD-10-CM

## 2024-09-02 DIAGNOSIS — F03.918 DEMENTIA WITH BEHAVIORAL DISTURBANCE (H): ICD-10-CM

## 2024-09-02 PROCEDURE — 99349 HOME/RES VST EST MOD MDM 40: CPT | Performed by: NURSE PRACTITIONER

## 2024-09-02 NOTE — LETTER
9/2/2024      Carmela Freeman  [address] [facility] [address] [facility] [address]        [facility] GERIATRICS  ACUTE/EPISODIC VISIT    [facility] Medical Record Number:  [id number]  Place of Service where encounter took place:  [facility] [place] ([facility]) [[id number]]    Chief Complaint   Patient presents with     RECHECK       HPI:    Carmela Freeman is a 89 year old  (4/25/1932), who is being seen today for an episodic care visit.  HPI information obtained from: facility chart records, facility staff, patient report, [facility] chart review, and Care Everywhere [facility] chart review.    Today's concern is:    Diagnoses         Codes Comments    Chronic diastolic congestive heart failure (H)    -  Primary I50.32     Chronic coronary microvascular dysfunction     I25.85     Peripheral edema     R60.0     Neurological deficit present     R29.818     Impaired mobility and activities of daily living     Z74.09, Z78.9     Alteration in skin integrity due to moisture     R23.9     Dementia with behavioral disturbance (H)     F03.918            Came to see Carmela today and how she has been doing.  Nursing went into the apartment with this NP as they have to open her door.  Found Carmela laying in her bed.  Already dressed.  She was willing to get up and go to the living room but she was worried about her toilet as it has been plugged.  In the time spent with her 3 people were going to call maintenance to come up to the apartment.  Did flush the toilet and it would not go donw.      Good to see Carmela ambulate.  Using the 2WW.  Staff assist with making she she could swing legs out of bed, to stand and then contact guard to the living room but she plops down in the chair.  Feel she is moving around much better than when first starting with Carmela.      Had a nice conversation with Carmela.  Was more talkative today.  One of her kids are coming in to stay with her for a few days and one just left within the past few days.       Asked her how her sore on her bottom is doing and she states that it has healed.  Home care should be wrapping up soon with her then.  Still have OT working with her and the left hand that has been contracted.  Carmela points it out that she has a hard time grasping the walker with the left hand completely.      States her visit is good but her hearing is very poor.  She will not wear hearing aides.  Did have this NP feel behind her ears as she feels there is something there but did not see anything and palpated both sides the same.    ALLERGIES:    Allergies   Allergen Reactions     Atorvastatin      PN: LW Reaction: myalgias        MEDICATIONS:  Post Discharge Medication Reconciliation Status: patient was not discharged from an inpatient facility or TCU.     Current Outpatient Medications   Medication Sig Dispense Refill     acetaminophen (TYLENOL) 500 MG tablet Take 2 tablets (1,000 mg) by mouth every 6 hours as needed for fever or pain       aspirin (ASA) 81 MG chewable tablet Take 81 mg by mouth daily       Barberry-Oreg Grape-Goldenseal 200-200-50 MG CAPS Take 1 capsule by mouth 2 times daily       CALMOSEPTINE 0.44-20.6 % OINT ointment APPLY TOPICALLY BUTTOCKS 2 TIMES DAILY (DX: SKIN EXCORIATION);APPLY TOPICALLY BUTTOCKS 2 TIMES DAILY AS NEEDED (DX: SKIN EXCORIATION) 113 g 10     OLANZapine zydis (ZYPREXA) 5 MG ODT Take 1 tablet by mouth every 6 hours as needed       polyethylene glycol (MIRALAX) 17 GM/Dose powder Take 17 g (1 Capful) by mouth daily as needed for constipation       risperiDONE (RISPERDAL M-TABS) 1 MG ODT Take 1 mg by mouth at bedtime       senna-docusate (SENOKOT-S/PERICOLACE) 8.6-50 MG tablet Take 1 tablet by mouth daily as needed       Therapeutic Multivit/Mineral tablet Take 1 tablet by mouth daily       traZODone (DESYREL) 50 MG tablet Take 12.5 mg by mouth as needed         REVIEW OF SYSTEMS:  4 point ROS neg other than the symptoms noted above in the HPI.  Denied chest pain, no SOB.  Bowels moving fine.      PHYSICAL EXAM:  /60   Pulse 76   Resp 18   Wt 49.9 kg (110 lb)   SpO2 95%     Heart rate regular and strong.  No edema.  Lungs are clear.  Abdomen is flat/round, soft, and non-tender.  Bowel sounds present.  Skin is pale, warm and dry.  No open areas.    Neatly groomed.  Does not make a lot of eye contact but turned head when asked how her visit was doing.  Left hand is still contracted.  Able to move the arm.  Alert and oriented to person, place and time can be vague with exact details.    Labs done at end of July.    ASSESSMENT / PLAN:  (I50.32) Chronic diastolic congestive heart failure (H)  (primary encounter diagnosis)  (R60.0) Peripheral edema  Comment: remains on Lasix 20mg po daily.  No acute symptoms.  No edema.  Continue to monitor.  Encouraged her to keep drinking fluids.  Has a glass of water by her chair in the living room.    (I25.85) Chronic coronary microvascular dysfunction  Comment: continues with ASA 81mg po daily.        (R29.818) Neurological deficit present  (Z74.09,  Z78.9) Impaired mobility and activities of daily living  Comment: continues with left hand contraction with unknown cause.  Therapies continues to be working with her for adl's and mobility.  Impressed with how she is moving around today.  Appears to be more alert and engaged compared to first visit.      (R23.9) Alteration in skin integrity due to moisture  Comment: resolved but home care still monitoring as they are seeing her less.  Do know in their notes they encourage her to reposition to off load pressure and reviewed about friction.      (F03.918) Dementia with behavioral disturbance (H)  Comment: managing in apartment for now with more help from staff.  Daughter whom lives close brings her food as the texture is altered.  Is on risperidone 1mg at HS to help with her mood and sleep.  No changes.  Ongoing monitoring to see about if she should be staying in the apartment.  Family visits  often.     Orders:  No new orders today    Electronically signed by  STUART Alexis CNP            Sincerely,        STUART Alexis CNP

## 2024-09-02 NOTE — LETTER
9/2/2024      Carmela Freeman  [address] [address]        No notes on file      Sincerely,        STUART Alexis CNP

## 2024-09-11 DIAGNOSIS — R23.8 SKIN IRRITATION: ICD-10-CM

## 2024-09-11 RX ORDER — ANORECTAL OINTMENT 15.7; .44; 24; 20.6 G/100G; G/100G; G/100G; G/100G
OINTMENT TOPICAL
Qty: 113 G | Refills: 0 | Status: SHIPPED | OUTPATIENT
Start: 2024-09-11

## 2024-10-02 ENCOUNTER — LAB REQUISITION (OUTPATIENT)
Dept: LAB | Facility: CLINIC | Age: 89
End: 2024-10-02
Payer: MEDICARE

## 2024-10-02 DIAGNOSIS — E55.9 VITAMIN D DEFICIENCY, UNSPECIFIED: ICD-10-CM

## 2024-10-07 LAB — VIT D+METAB SERPL-MCNC: 95 NG/ML (ref 20–50)

## 2024-10-07 PROCEDURE — P9604 ONE-WAY ALLOW PRORATED TRIP: HCPCS | Mod: ORL | Performed by: NURSE PRACTITIONER

## 2024-10-07 PROCEDURE — 82306 VITAMIN D 25 HYDROXY: CPT | Mod: ORL | Performed by: NURSE PRACTITIONER

## 2024-10-07 PROCEDURE — 36415 COLL VENOUS BLD VENIPUNCTURE: CPT | Mod: ORL | Performed by: NURSE PRACTITIONER

## 2024-10-14 ENCOUNTER — ASSISTED LIVING VISIT (OUTPATIENT)
Dept: GERIATRICS | Facility: CLINIC | Age: 89
End: 2024-10-14
Payer: MEDICARE

## 2024-10-14 VITALS
OXYGEN SATURATION: 98 % | SYSTOLIC BLOOD PRESSURE: 159 MMHG | DIASTOLIC BLOOD PRESSURE: 60 MMHG | WEIGHT: 114 LBS | HEART RATE: 70 BPM | RESPIRATION RATE: 19 BRPM | TEMPERATURE: 97.3 F

## 2024-10-14 DIAGNOSIS — R60.0 PERIPHERAL EDEMA: ICD-10-CM

## 2024-10-14 DIAGNOSIS — H91.13 PRESBYCUSIS OF BOTH EARS: Primary | ICD-10-CM

## 2024-10-14 DIAGNOSIS — I50.32 CHRONIC DIASTOLIC CONGESTIVE HEART FAILURE (H): ICD-10-CM

## 2024-10-14 DIAGNOSIS — H61.21 IMPACTED CERUMEN OF RIGHT EAR: ICD-10-CM

## 2024-10-14 PROCEDURE — 99349 HOME/RES VST EST MOD MDM 40: CPT | Mod: 25 | Performed by: NURSE PRACTITIONER

## 2024-10-14 PROCEDURE — 69209 REMOVE IMPACTED EAR WAX UNI: CPT | Performed by: NURSE PRACTITIONER

## 2024-10-14 RX ORDER — FUROSEMIDE 20 MG/1
20 TABLET ORAL DAILY
COMMUNITY
Start: 2024-09-25

## 2024-10-14 NOTE — LETTER
10/14/2024      Carmela Freeman  [address] [facility] [address] [address]        [facility] GERIATRICS  ACUTE/EPISODIC VISIT    [facility] Medical Record Number:  [id number]  Place of Service where encounter took place:  [facility] [place] ([facility]) [[id number]]    Chief Complaint   Patient presents with     RECHECK       HPI:    Carmela Freeman is a 89 year old  (4/25/1932), who is being seen today for an episodic care visit.  HPI information obtained from: facility chart records, facility staff, patient report, and [facility] chart review.    Today's concern is:    Diagnoses         Codes Comments    Presbycusis of both ears    -  Primary H91.13     Impacted cerumen of right ear     H61.21     Chronic diastolic congestive heart failure (H)     I50.32     Peripheral edema     R60.0           This NP was asked to look in Carmela's ears and clean them out if cerumen present.  Later this month, Carmela is going to have evaluation for new hearing aides.    Went into Carmela's apartment with the nurse and found Carmela laying in bed.  Nurse helped Carmela sit up on the edge and checked her ears there.  Would let her go back to bed if she wanted to if the canals were empty.  Found the right ear cancel with cerumen and so proceeded to have nursing bring her out to the kitchen to sit in a chair and prep her for irrigation of the canal.    After the visit, she took her 2WW and was escorted out to the living room where she was going to sit and wait until the aid would come to help with cares and medications.      ALLERGIES:    Allergies   Allergen Reactions     Atorvastatin      PN: LW Reaction: myalgias        MEDICATIONS:  Post Discharge Medication Reconciliation Status: patient was not discharged from an inpatient facility or TCU.     Current Outpatient Medications   Medication Sig Dispense Refill     furosemide (LASIX) 20 MG tablet Take 20 mg by mouth daily.       acetaminophen (TYLENOL) 500 MG tablet Take 2 tablets  (1,000 mg) by mouth every 6 hours as needed for fever or pain       aspirin (ASA) 81 MG chewable tablet Take 81 mg by mouth daily       Barberry-Oreg Grape-Goldenseal 200-200-50 MG CAPS Take 1 capsule by mouth 2 times daily       CALMOSEPTINE 0.44-20.6 % OINT ointment APPLY TO AFFECTED AREA 2 TIMES DAILY AND 2 TIMES DAILY AS NEEDED 113 g 0     OLANZapine zydis (ZYPREXA) 5 MG ODT Take 1 tablet by mouth every 6 hours as needed       polyethylene glycol (MIRALAX) 17 GM/Dose powder Take 17 g (1 Capful) by mouth daily as needed for constipation       risperiDONE (RISPERDAL M-TABS) 1 MG ODT Take 1 mg by mouth at bedtime       senna-docusate (SENOKOT-S/PERICOLACE) 8.6-50 MG tablet Take 1 tablet by mouth daily as needed       Therapeutic Multivit/Mineral tablet Take 1 tablet by mouth daily       traZODone (DESYREL) 50 MG tablet Take 12.5 mg by mouth as needed       Medications reviewed:  Medications reconciled to facility chart and changes were made to reflect current medications as identified as above med list. Below are the changes that were made:   Medications stopped since last EPIC medication reconciliation:   There are no discontinued medications.    Medications started since last [facility] medication reconciliation:  Orders Placed This Encounter   Medications     furosemide (LASIX) 20 MG tablet     Sig: Take 20 mg by mouth daily.         REVIEW OF SYSTEMS:  4 point ROS neg other than the symptoms noted above in the HPI.  Denied chest pain, SOB, and no stomach distress.      PHYSICAL EXAM:  BP (!) 159/60   Pulse 70   Temp 97.3  F (36.3  C)   Resp 19   Wt 51.7 kg (114 lb)   SpO2 98%   Frail woman whom is dependent on staff for cues on what to do.  She can make simple decisions.    Skin is pale, dry and warm.    Heart rate regular and strong.  No edema in lower legs.  Lungs are clear.  Voice is clear.  Abdomen is flat, soft and non-tender.      Left ear canal has no cerumen present.  TM pearly gray.  Right ear canal has  cerumen present that is blocking the TM.    With the elephant ear wash system, flushed her canal out with warm water.  Within seconds there was dark brown cerumen that expelled.  Looked in canal and could see her TM pearly gray.      ASSESSMENT / PLAN:  (H91.13) Presbycusis of both ears  (primary encounter diagnosis)  (H61.21) Impacted cerumen of right ear  Comment: plan is to have new hearing aides at the end of the month and so would want the wax to be removed before the appointment.  Give some time to dry but otherwise, Carmela had no complaints.  Tolerated well the irrigation.    Regardless had to speak into her ear she she could hear the questions or statements and respond.  Plan: [place] REMOVAL IMPACTED CERUMEN IRRIGATION/LVG         UNILAT    (I50.32) Chronic diastolic congestive heart failure (H)  (R60.0) Peripheral edema  Comment: managed with lasix 20mg po daily.  No acute exacerbations.    Had blood work done in July.  Doing well.  Feel she is thriving with the help of nursing.       Orders:  No new orders today.    Electronically signed by  STUART Alexis CNP            Sincerely,        STUART Alexis CNP

## 2024-10-14 NOTE — PROGRESS NOTES
[facility] GERIATRICS  ACUTE/EPISODIC VISIT    [facility] Medical Record Number:  [id number]  Place of Service where encounter took place:  [facility] ([facility]) [[id number]]    Chief Complaint   Patient presents with    RECHECK       HPI:    Carmela Freeman is a 89 year old  (4/25/1932), who is being seen today for an episodic care visit.  HPI information obtained from: facility chart records, facility staff, patient report, and [facility] chart review.    Today's concern is:    Diagnoses         Codes Comments    Presbycusis of both ears    -  Primary H91.13     Impacted cerumen of right ear     H61.21     Chronic diastolic congestive heart failure (H)     I50.32     Peripheral edema     R60.0           This NP was asked to look in Carmela's ears and clean them out if cerumen present.  Later this month, Carmela is going to have evaluation for new hearing aides.    Went into Carmela's apartment with the nurse and found Carmela laying in bed.  Nurse helped Carmela sit up on the edge and checked her ears there.  Would let her go back to bed if she wanted to if the canals were empty.  Found the right ear cancel with cerumen and so proceeded to have nursing bring her out to the kitchen to sit in a chair and prep her for irrigation of the canal.    After the visit, she took her 2WW and was escorted out to the living room where she was going to sit and wait until the aid would come to help with cares and medications.      ALLERGIES:    Allergies   Allergen Reactions    Atorvastatin      PN: LW Reaction: myalgias        MEDICATIONS:  Post Discharge Medication Reconciliation Status: patient was not discharged from an inpatient facility or TCU.     Current Outpatient Medications   Medication Sig Dispense Refill    furosemide (LASIX) 20 MG tablet Take 20 mg by mouth daily.      acetaminophen (TYLENOL) 500 MG tablet Take 2 tablets (1,000 mg) by mouth every 6 hours as needed for fever or pain      aspirin (ASA) 81 MG  chewable tablet Take 81 mg by mouth daily      Barberry-Oreg Grape-Goldenseal 200-200-50 MG CAPS Take 1 capsule by mouth 2 times daily      CALMOSEPTINE 0.44-20.6 % OINT ointment APPLY TO AFFECTED AREA 2 TIMES DAILY AND 2 TIMES DAILY AS NEEDED 113 g 0    OLANZapine zydis (ZYPREXA) 5 MG ODT Take 1 tablet by mouth every 6 hours as needed      polyethylene glycol (MIRALAX) 17 GM/Dose powder Take 17 g (1 Capful) by mouth daily as needed for constipation      risperiDONE (RISPERDAL M-TABS) 1 MG ODT Take 1 mg by mouth at bedtime      senna-docusate (SENOKOT-S/PERICOLACE) 8.6-50 MG tablet Take 1 tablet by mouth daily as needed      Therapeutic Multivit/Mineral tablet Take 1 tablet by mouth daily      traZODone (DESYREL) 50 MG tablet Take 12.5 mg by mouth as needed       Medications reviewed:  Medications reconciled to facility chart and changes were made to reflect current medications as identified as above med list. Below are the changes that were made:   Medications stopped since last EPIC medication reconciliation:   There are no discontinued medications.    Medications started since last [facility] medication reconciliation:  Orders Placed This Encounter   Medications    furosemide (LASIX) 20 MG tablet     Sig: Take 20 mg by mouth daily.         REVIEW OF SYSTEMS:  4 point ROS neg other than the symptoms noted above in the HPI.  Denied chest pain, SOB, and no stomach distress.      PHYSICAL EXAM:  BP (!) 159/60   Pulse 70   Temp 97.3  F (36.3  C)   Resp 19   Wt 51.7 kg (114 lb)   SpO2 98%   Frail woman whom is dependent on staff for cues on what to do.  She can make simple decisions.    Skin is pale, dry and warm.    Heart rate regular and strong.  No edema in lower legs.  Lungs are clear.  Voice is clear.  Abdomen is flat, soft and non-tender.      Left ear canal has no cerumen present.  TM pearly gray.  Right ear canal has cerumen present that is blocking the TM.    With the elephant ear wash system, flushed her canal  out with warm water.  Within seconds there was dark brown cerumen that expelled.  Looked in canal and could see her TM pearly gray.      ASSESSMENT / PLAN:  (H91.13) Presbycusis of both ears  (primary encounter diagnosis)  (H61.21) Impacted cerumen of right ear  Comment: plan is to have new hearing aides at the end of the month and so would want the wax to be removed before the appointment.  Give some time to dry but otherwise, Carmela had no complaints.  Tolerated well the irrigation.    Regardless had to speak into her ear she she could hear the questions or statements and respond.  Plan: [place] REMOVAL IMPACTED CERUMEN IRRIGATION/LVG         UNILAT    (I50.32) Chronic diastolic congestive heart failure (H)  (R60.0) Peripheral edema  Comment: managed with lasix 20mg po daily.  No acute exacerbations.    Had blood work done in July.  Doing well.  Feel she is thriving with the help of nursing.       Orders:  No new orders today.    Electronically signed by  STUART Alexis CNP

## 2024-10-30 DIAGNOSIS — R23.8 SKIN IRRITATION: ICD-10-CM

## 2024-10-30 RX ORDER — ANORECTAL OINTMENT 15.7; .44; 24; 20.6 G/100G; G/100G; G/100G; G/100G
OINTMENT TOPICAL
Qty: 113 G | Refills: 11 | Status: SHIPPED | OUTPATIENT
Start: 2024-10-30

## 2024-12-03 DIAGNOSIS — F03.918 DEMENTIA WITH BEHAVIORAL DISTURBANCE (H): Primary | ICD-10-CM

## 2024-12-03 RX ORDER — RISPERIDONE 1 MG/1
1 TABLET, ORALLY DISINTEGRATING ORAL AT BEDTIME
Qty: 30 TABLET | Refills: 11 | Status: SHIPPED | OUTPATIENT
Start: 2024-12-03

## 2025-01-08 ENCOUNTER — LAB REQUISITION (OUTPATIENT)
Dept: LAB | Facility: CLINIC | Age: OVER 89
End: 2025-01-08
Payer: MEDICARE

## 2025-01-08 DIAGNOSIS — E11.9 TYPE 2 DIABETES MELLITUS WITHOUT COMPLICATIONS (H): ICD-10-CM

## 2025-01-14 LAB
ANION GAP SERPL CALCULATED.3IONS-SCNC: 15 MMOL/L (ref 7–15)
BUN SERPL-MCNC: 33 MG/DL (ref 8–23)
CALCIUM SERPL-MCNC: 9.5 MG/DL (ref 8.8–10.4)
CHLORIDE SERPL-SCNC: 100 MMOL/L (ref 98–107)
CREAT SERPL-MCNC: 1.27 MG/DL (ref 0.51–0.95)
EGFRCR SERPLBLD CKD-EPI 2021: 39 ML/MIN/1.73M2
ERYTHROCYTE [DISTWIDTH] IN BLOOD BY AUTOMATED COUNT: 13.8 % (ref 10–15)
EST. AVERAGE GLUCOSE BLD GHB EST-MCNC: 120 MG/DL
GLUCOSE SERPL-MCNC: 159 MG/DL (ref 70–99)
HBA1C MFR BLD: 5.8 %
HCO3 SERPL-SCNC: 23 MMOL/L (ref 22–29)
HCT VFR BLD AUTO: 33.9 % (ref 35–47)
HGB BLD-MCNC: 10.7 G/DL (ref 11.7–15.7)
MCH RBC QN AUTO: 29.5 PG (ref 26.5–33)
MCHC RBC AUTO-ENTMCNC: 31.6 G/DL (ref 31.5–36.5)
MCV RBC AUTO: 93 FL (ref 78–100)
PLATELET # BLD AUTO: 328 10E3/UL (ref 150–450)
POTASSIUM SERPL-SCNC: 4 MMOL/L (ref 3.4–5.3)
RBC # BLD AUTO: 3.63 10E6/UL (ref 3.8–5.2)
SODIUM SERPL-SCNC: 138 MMOL/L (ref 135–145)
WBC # BLD AUTO: 10.5 10E3/UL (ref 4–11)

## 2025-02-10 ENCOUNTER — ASSISTED LIVING VISIT (OUTPATIENT)
Dept: GERIATRICS | Facility: CLINIC | Age: OVER 89
End: 2025-02-10
Payer: MEDICARE

## 2025-02-10 VITALS
TEMPERATURE: 97.2 F | OXYGEN SATURATION: 99 % | RESPIRATION RATE: 19 BRPM | WEIGHT: 100 LBS | HEART RATE: 68 BPM | DIASTOLIC BLOOD PRESSURE: 72 MMHG | SYSTOLIC BLOOD PRESSURE: 117 MMHG

## 2025-02-10 DIAGNOSIS — E11.319 TYPE 2 DIABETES MELLITUS WITH RETINOPATHY, WITHOUT LONG-TERM CURRENT USE OF INSULIN, MACULAR EDEMA PRESENCE UNSPECIFIED, UNSPECIFIED LATERALITY, UNSPECIFIED RETINOPATHY SEVERITY (H): Primary | ICD-10-CM

## 2025-02-10 DIAGNOSIS — F03.918 DEMENTIA WITH BEHAVIORAL DISTURBANCE (H): ICD-10-CM

## 2025-02-10 DIAGNOSIS — I50.32 CHRONIC DIASTOLIC CONGESTIVE HEART FAILURE (H): ICD-10-CM

## 2025-02-10 DIAGNOSIS — F22 DELUSIONAL DISORDERS (H): ICD-10-CM

## 2025-02-10 DIAGNOSIS — R29.818 NEUROLOGICAL DEFICIT PRESENT: ICD-10-CM

## 2025-02-10 PROCEDURE — 99349 HOME/RES VST EST MOD MDM 40: CPT | Performed by: NURSE PRACTITIONER

## 2025-02-10 NOTE — LETTER
2/10/2025      Carmela Freeman  [address] [address]        No notes on file      Sincerely,        STUART Alexis CNP    Electronically signed   inpatient facility or TCU. Medications reviewed      Current Outpatient Medications   Medication Sig Dispense Refill     acetaminophen (TYLENOL) 500 MG tablet Take 2 tablets (1,000 mg) by mouth every 6 hours as needed for fever or pain       aspirin (ASA) 81 MG chewable tablet Take 81 mg by mouth daily       Barberry-Oreg Grape-Goldenseal 200-200-50 MG CAPS Take 1 capsule by mouth 2 times daily       CALMOSEPTINE 0.44-20.6 % OINT ointment APPLY TO AFFECTED AREA 2 TIMES DAILY AND 2 TIMES DAILY AS NEEDED 113 g 11     furosemide (LASIX) 20 MG tablet Take 20 mg by mouth daily.       polyethylene glycol (MIRALAX) 17 GM/Dose powder Take 17 g (1 Capful) by mouth daily as needed for constipation       risperiDONE (RISPERDAL M-TABS) 1 MG ODT Take 1 tablet (1 mg) by mouth at bedtime. 30 tablet 11     senna-docusate (SENOKOT-S/PERICOLACE) 8.6-50 MG tablet Take 1 tablet by mouth daily as needed       Therapeutic Multivit/Mineral tablet Take 1 tablet by mouth daily       traZODone (DESYREL) 50 MG tablet Take 12.5 mg by mouth as needed         REVIEW OF SYSTEMS:  4 point ROS neg other than the symptoms noted above in the HPI.  No chest pain, no respiratory issues.        PHYSICAL EXAM:  /72   Pulse 68   Temp 97.2  F (36.2  C)   Resp 19   Wt 45.4 kg (100 lb)   SpO2 99%   Elderly female with smooth complexion in her bathroom today.  Neatly groomed.    Does not wear glasses.    Skin is pale/pink, dry and warm.  Her left hand is folded in some for unknown cause.  Did not pursue looking into reason.    Face is symmetrical.  Voice is clear.  Content of speech makes sense.  Heart rate regular and strong.  No edema.  Lungs are clear.    Abdomen is flat, soft, and non-tender.      Component      Latest Ref Rng 1/14/2025  1:16 PM   Sodium      135 - 145 mmol/L 138    Potassium      3.4 - 5.3 mmol/L 4.0    Chloride      98 - 107 mmol/L 100    Carbon Dioxide (CO2)      22 - 29 mmol/L 23    Anion Gap      7 - 15 mmol/L 15    Urea  Nitrogen      8.0 - 23.0 mg/dL 33.0 (H)    Creatinine      0.51 - 0.95 mg/dL 1.27 (H)    GFR Estimate      >60 mL/min/1.73m2 39 (L)    Calcium      8.8 - 10.4 mg/dL 9.5    Estimated Average Glucose      <117 mg/dL 120 (H)    Hemoglobin A1C      <5.7 % 5.8 (H)    Glucose      70 - 99 mg/dL 159 (H)           ASSESSMENT / PLAN:  (E11.319) Type 2 diabetes mellitus with retinopathy, without long-term current use of insulin, macular edema presence unspecified, unspecified laterality, unspecified retinopathy severity (H)  (primary encounter diagnosis)  Comment: no medications.  Family would need to follow up with vision exams for Carmela if interested.  A1c acceptable range.      (I50.32) Chronic diastolic congestive heart failure (H)  Comment: no exacerbations of late.  Lasix 20mg po daily.  Labs acceptable.    (R29.818) Neurological deficit present  Comment: left hand is curled in some.  She exercises it through out the day.  Staff help with the walker but she does pretty well herself.  No pain.      (F22) Delusional disorders (H)  (F03.918) Dementia with behavioral disturbance (H)  Comment: continues with the Risperdone 1mg at HS.  Feel she has adjusted to her environment.  Have not heard recent issues with abnormal thoughts.  No changes today for a dose reduction.     Orders:  Please attempt to change the multi-vitamin to a smaller tab for ease of swallowing.    Electronically signed by  STUART Alexis CNP            Sincerely,        STUART Alexis CNP    Electronically signed

## 2025-02-10 NOTE — PROGRESS NOTES
[facility] GERIATRICS  ACUTE/EPISODIC VISIT    [facility] Medical Record Number:  [id number]  Place of Service where encounter took place:  [facility] [place] ([facility]) [[id number]]    Chief Complaint   Patient presents with    RECHECK       HPI:    Carmela Freeman is a 89 year old  (4/25/1932), who is being seen today for an episodic care visit.  HPI information obtained from: {FGS HPI:[id number]}.    Today's concern is:      ALLERGIES:    Allergies   Allergen Reactions    Atorvastatin      PN: LW Reaction: myalgias        MEDICATIONS:  Post Discharge Medication Reconciliation Status: {ACO Med Rec (Provider):[id number]}. ***    Current Outpatient Medications   Medication Sig Dispense Refill    acetaminophen (TYLENOL) 500 MG tablet Take 2 tablets (1,000 mg) by mouth every 6 hours as needed for fever or pain      aspirin (ASA) 81 MG chewable tablet Take 81 mg by mouth daily      Barberry-Oreg Grape-Goldenseal 200-200-50 MG CAPS Take 1 capsule by mouth 2 times daily      CALMOSEPTINE 0.44-20.6 % OINT ointment APPLY TO AFFECTED AREA 2 TIMES DAILY AND 2 TIMES DAILY AS NEEDED 113 g 11    furosemide (LASIX) 20 MG tablet Take 20 mg by mouth daily.      polyethylene glycol (MIRALAX) 17 GM/Dose powder Take 17 g (1 Capful) by mouth daily as needed for constipation      risperiDONE (RISPERDAL M-TABS) 1 MG ODT Take 1 tablet (1 mg) by mouth at bedtime. 30 tablet 11    senna-docusate (SENOKOT-S/PERICOLACE) 8.6-50 MG tablet Take 1 tablet by mouth daily as needed      Therapeutic Multivit/Mineral tablet Take 1 tablet by mouth daily      traZODone (DESYREL) 50 MG tablet Take 12.5 mg by mouth as needed       Medications reviewed:  Medications reconciled to facility chart and changes were made to reflect current medications as identified as above med list. Below are the changes that were made:   Medications stopped since last EPIC medication reconciliation:   There are no discontinued medications.    Medications started since last  EPIC medication reconciliation:  No orders of the defined types were placed in this encounter.    ***    REVIEW OF SYSTEMS:  4 point ROS neg other than the symptoms noted above in the HPI.***  Unable to be obtained due to cognitive impairment or aphasia.   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  Family would need to follow up with vision exams for Carmela if interested.  A1c acceptable range.      (I50.32) Chronic diastolic congestive heart failure (H)  Comment: no exacerbations of late.  Lasix 20mg po daily.  Labs acceptable.    (R29.818) Neurological deficit present  Comment: left hand is curled in some.  She exercises it through out the day.  Staff help with the walker but she does pretty well herself.  No pain.      (F22) Delusional disorders (H)  (F03.918) Dementia with behavioral disturbance (H)  Comment: continues with the Risperdone 1mg at HS.  Feel she has adjusted to her environment.  Have not heard recent issues with abnormal thoughts.  No changes today for a dose reduction.     Orders:  Please attempt to change the multi-vitamin to a smaller tab for ease of swallowing.    Electronically signed by  STUART Alexis CNP

## 2025-02-15 ENCOUNTER — TELEPHONE (OUTPATIENT)
Dept: GERIATRICS | Facility: CLINIC | Age: OVER 89
End: 2025-02-15
Payer: MEDICARE

## 2025-02-15 NOTE — TELEPHONE ENCOUNTER
Staff noticed they have two orders for Trazodone in their orders for Carmela.    This NP only knows of 12.5mg at HS prn for sleep disturbances.  Since not using any PRNs, will discontinue the orders.    Orders:  Discontinue Trazodone order(s).     No use.    STUART Alexis CNP

## 2025-02-18 DIAGNOSIS — Z78.9 TAKES DIETARY SUPPLEMENTS: Primary | ICD-10-CM

## 2025-02-18 RX ORDER — MULTIVITAMIN WITH FOLIC ACID 400 MCG
1 TABLET ORAL DAILY
Qty: 30 TABLET | Refills: 11 | Status: SHIPPED | OUTPATIENT
Start: 2025-02-18

## 2025-03-25 DIAGNOSIS — K64.9 HEMORRHOIDS, UNSPECIFIED HEMORRHOID TYPE: Primary | ICD-10-CM

## 2025-03-25 RX ORDER — HYDROCORTISONE 25 MG/G
CREAM TOPICAL
Qty: 30 G | Refills: 11 | Status: SHIPPED | OUTPATIENT
Start: 2025-03-25 | End: 2025-03-26

## 2025-03-26 DIAGNOSIS — K64.9 HEMORRHOIDS, UNSPECIFIED HEMORRHOID TYPE: ICD-10-CM

## 2025-03-26 RX ORDER — HYDROCORTISONE 25 MG/G
CREAM TOPICAL 2 TIMES DAILY PRN
Qty: 30 G | Refills: 11 | Status: SHIPPED | OUTPATIENT
Start: 2025-03-26

## 2025-04-08 DIAGNOSIS — R52 GENERALIZED PAIN: ICD-10-CM

## 2025-04-08 DIAGNOSIS — I50.32 CHRONIC DIASTOLIC CONGESTIVE HEART FAILURE (H): Primary | ICD-10-CM

## 2025-04-08 RX ORDER — FUROSEMIDE 20 MG/1
20 TABLET ORAL DAILY
Qty: 30 TABLET | Refills: 11 | Status: SHIPPED | OUTPATIENT
Start: 2025-04-08

## 2025-04-08 RX ORDER — ASPIRIN 81 MG/1
81 TABLET, CHEWABLE ORAL DAILY
Qty: 30 TABLET | Refills: 11 | Status: SHIPPED | OUTPATIENT
Start: 2025-04-08

## 2025-04-08 RX ORDER — PSEUDOEPHED/ACETAMINOPH/DIPHEN 30MG-500MG
TABLET ORAL
Qty: 240 TABLET | Refills: 11 | Status: SHIPPED | OUTPATIENT
Start: 2025-04-08

## 2025-06-16 ENCOUNTER — ASSISTED LIVING VISIT (OUTPATIENT)
Dept: GERIATRICS | Facility: CLINIC | Age: OVER 89
End: 2025-06-16
Payer: MEDICARE

## 2025-06-16 VITALS
OXYGEN SATURATION: 96 % | RESPIRATION RATE: 18 BRPM | TEMPERATURE: 98.2 F | HEART RATE: 76 BPM | SYSTOLIC BLOOD PRESSURE: 111 MMHG | DIASTOLIC BLOOD PRESSURE: 65 MMHG | WEIGHT: 110 LBS

## 2025-06-16 DIAGNOSIS — F22 DELUSIONAL DISORDERS (H): ICD-10-CM

## 2025-06-16 DIAGNOSIS — R29.818 NEUROLOGICAL DEFICIT PRESENT: ICD-10-CM

## 2025-06-16 DIAGNOSIS — N18.32 CHRONIC KIDNEY DISEASE, STAGE 3B (H): ICD-10-CM

## 2025-06-16 DIAGNOSIS — E11.319 TYPE 2 DIABETES MELLITUS WITH RETINOPATHY, WITHOUT LONG-TERM CURRENT USE OF INSULIN, MACULAR EDEMA PRESENCE UNSPECIFIED, UNSPECIFIED LATERALITY, UNSPECIFIED RETINOPATHY SEVERITY (H): Primary | ICD-10-CM

## 2025-06-16 DIAGNOSIS — F03.918 DEMENTIA WITH BEHAVIORAL DISTURBANCE (H): ICD-10-CM

## 2025-06-16 DIAGNOSIS — I50.32 CHRONIC DIASTOLIC CONGESTIVE HEART FAILURE (H): ICD-10-CM

## 2025-06-16 PROCEDURE — 99349 HOME/RES VST EST MOD MDM 40: CPT | Performed by: NURSE PRACTITIONER

## 2025-06-16 NOTE — LETTER
6/16/2025      Carmela Freeman  [address] [facility] [address]        No notes on file      Sincerely,        STUART Alexis CNP    Electronically signed   chewable tablet Take 1 tablet (81 mg) by mouth daily. 30 tablet 11     Barberry-Oreg Grape-Goldenseal 200-200-50 MG CAPS Take 1 capsule by mouth 2 times daily       CALMOSEPTINE 0.44-20.6 % OINT ointment APPLY TO AFFECTED AREA 2 TIMES DAILY AND 2 TIMES DAILY AS NEEDED 113 g 11     furosemide (LASIX) 20 MG tablet Take 1 tablet (20 mg) by mouth daily. 30 tablet 11     hydrocortisone, Perianal, (PROCTO-MED HC) 2.5 % cream Place rectally 2 times daily as needed for hemorrhoids. 30 g 11     Multiple Vitamin (DAILY-IAN MULTIVITAMIN) TABS 1 TABLET ORALLY DAILY 30 tablet 11     polyethylene glycol (MIRALAX) 17 GM/Dose powder Take 17 g (1 Capful) by mouth daily as needed for constipation       risperiDONE (RISPERDAL M-TABS) 1 MG ODT Take 1 tablet (1 mg) by mouth at bedtime. 30 tablet 11     senna-docusate (SENOKOT-S/PERICOLACE) 8.6-50 MG tablet Take 1 tablet by mouth daily as needed       Therapeutic Multivit/Mineral tablet Take 1 tablet by mouth daily           REVIEW OF SYSTEMS:  4 point ROS neg other than the symptoms noted above in the HPI.  No chest pain, no SOB.      PHYSICAL EXAM:  /65   Pulse 76   Temp 98.2  F (36.8  C)   Resp 18   Wt 49.9 kg (110 lb)   SpO2 96%   Carmela was sitting in her quiet apartment.  No acute distress.  Neatly groomed.  Skin is pale - per baseline.    Makes eye contact but vision is not the best.  Lungs are clear.  Heart rate regular and strong.  No edema.    Abdomen soft and non-tender.  Bowel sounds present.  Ambulates with walker.   Left hand shows contracture.  No change in appearance.    No recent labs      ASSESSMENT / PLAN:  (E11.319) Type 2 diabetes mellitus with retinopathy, without long-term current use of insulin, macular edema presence unspecified, unspecified laterality, unspecified retinopathy severity (H)  (primary encounter diagnosis)  Comment: no blood sugars are checked.  Just following her A1c.  No medications.    Will have A1c done next lab day    (I50.32)  Chronic diastolic congestive heart failure (H)  (N18.32) Chronic kidney disease, stage 3b (H)  Comment: currently taking Lasix 20mg po daily and no K+ supplement.  Will check CBC and BMP next month.      (R29.818) Neurological deficit present  Comment: no c/o pain in the left hand.  No treatment for the left hand.  No other neurological deficit.    (F22) Delusional disorders (H)  (F03.918) Dementia with behavioral disturbance (H)  Comment: takes Risperdone 1mg po daily.  No behaviors noted.  Seems content.  Staff help anticipate needs as she can express her needs as well       Orders:  CBC, BMP and A1c next lab day    Electronically signed by  STUART Alexis CNP            Sincerely,        STUART Alexis CNP    Electronically signed

## 2025-06-16 NOTE — PROGRESS NOTES
[facility] GERIATRICS  ACUTE/EPISODIC VISIT    [facility] Medical Record Number:  [id number]  Place of Service where encounter took place:  [facility]) [[id number]]    Chief Complaint   Patient presents with    RECHECK       HPI:    Carmela Freeman is a 89 year old  (4/25/1932), who is being seen today for an episodic care visit.  HPI information obtained from: {FGS HPI:681318}.    Today's concern is:      ALLERGIES:    Allergies   Allergen Reactions    Atorvastatin      PN: LW Reaction: myalgias        MEDICATIONS:  Post Discharge Medication Reconciliation Status: {ACO Med Rec (Provider):055992}. ***    Current Outpatient Medications   Medication Sig Dispense Refill    acetaminophen (TYLENOL) 500 MG tablet 2 TABLETS (1000MG) ORALLY EVERY 6 HOURS AS NEEDED (DX: PAIN) (MAX 4GM TYLENOL OR ACETAMINOPHEN/24 HRS) 240 tablet 11    aspirin (ASA) 81 MG chewable tablet Take 1 tablet (81 mg) by mouth daily. 30 tablet 11    Barberry-Oreg Grape-Goldenseal 200-200-50 MG CAPS Take 1 capsule by mouth 2 times daily      CALMOSEPTINE 0.44-20.6 % OINT ointment APPLY TO AFFECTED AREA 2 TIMES DAILY AND 2 TIMES DAILY AS NEEDED 113 g 11    furosemide (LASIX) 20 MG tablet Take 1 tablet (20 mg) by mouth daily. 30 tablet 11    hydrocortisone, Perianal, (PROCTO-MED HC) 2.5 % cream Place rectally 2 times daily as needed for hemorrhoids. 30 g 11    Multiple Vitamin (DAILY-IAN MULTIVITAMIN) TABS 1 TABLET ORALLY DAILY 30 tablet 11    polyethylene glycol (MIRALAX) 17 GM/Dose powder Take 17 g (1 Capful) by mouth daily as needed for constipation      risperiDONE (RISPERDAL M-TABS) 1 MG ODT Take 1 tablet (1 mg) by mouth at bedtime. 30 tablet 11    senna-docusate (SENOKOT-S/PERICOLACE) 8.6-50 MG tablet Take 1 tablet by mouth daily as needed      Therapeutic Multivit/Mineral tablet Take 1 tablet by mouth daily       Medications reviewed:  Medications reconciled to facility chart and changes were made to reflect current  medications as identified as above med list. Below are the changes that were made:   Medications stopped since last EP[facility] medication reconciliation:   There are no discontinued medications.    Medications started since last [facility] medication reconciliation:  No orders of the defined types were placed in this encounter.    ***    REVIEW OF SYSTEMS:  4 point ROS neg other than the symptoms noted above in the HPI.***  Unable to be obtained due to cognitive impairment or aphasia.   ROS    PHYSICAL EXAM:  /65   Pulse 76   Temp 98.2  F (36.8  C)   Resp 18   Wt 49.9 kg (110 lb)   SpO2 96%   Physical Exam      ASSESSMENT / PLAN:  {FGS DX:060289}    Orders:  ***    Electronically signed by  Felecia Cooper         (H)  Comment: currently taking Lasix 20mg po daily and no K+ supplement.  Will check CBC and BMP next month.      (R29.818) Neurological deficit present  Comment: no c/o pain in the left hand.  No treatment for the left hand.  No other neurological deficit.    (F22) Delusional disorders (H)  (F03.918) Dementia with behavioral disturbance (H)  Comment: takes Risperdone 1mg po daily.  No behaviors noted.  Seems content.  Staff help anticipate needs as she can express her needs as well       Orders:  CBC, BMP and A1c next lab day    Electronically signed by  STUART Alexis CNP

## 2025-06-17 ENCOUNTER — LAB REQUISITION (OUTPATIENT)
Dept: LAB | Facility: CLINIC | Age: OVER 89
End: 2025-06-17
Payer: MEDICARE

## 2025-06-17 DIAGNOSIS — E11.9 TYPE 2 DIABETES MELLITUS WITHOUT COMPLICATIONS (H): ICD-10-CM

## 2025-06-23 LAB
ANION GAP SERPL CALCULATED.3IONS-SCNC: 13 MMOL/L (ref 7–15)
BUN SERPL-MCNC: 29.8 MG/DL (ref 8–23)
CALCIUM SERPL-MCNC: 9.8 MG/DL (ref 8.8–10.4)
CHLORIDE SERPL-SCNC: 102 MMOL/L (ref 98–107)
CREAT SERPL-MCNC: 1.16 MG/DL (ref 0.51–0.95)
EGFRCR SERPLBLD CKD-EPI 2021: 44 ML/MIN/1.73M2
ERYTHROCYTE [DISTWIDTH] IN BLOOD BY AUTOMATED COUNT: 14.3 % (ref 10–15)
EST. AVERAGE GLUCOSE BLD GHB EST-MCNC: 108 MG/DL
GLUCOSE SERPL-MCNC: 90 MG/DL (ref 70–99)
HBA1C MFR BLD: 5.4 %
HCO3 SERPL-SCNC: 27 MMOL/L (ref 22–29)
HCT VFR BLD AUTO: 35.9 % (ref 35–47)
HGB BLD-MCNC: 11.3 G/DL (ref 11.7–15.7)
MCH RBC QN AUTO: 29.4 PG (ref 26.5–33)
MCHC RBC AUTO-ENTMCNC: 31.5 G/DL (ref 31.5–36.5)
MCV RBC AUTO: 94 FL (ref 78–100)
PLATELET # BLD AUTO: 263 10E3/UL (ref 150–450)
POTASSIUM SERPL-SCNC: 4 MMOL/L (ref 3.4–5.3)
RBC # BLD AUTO: 3.84 10E6/UL (ref 3.8–5.2)
SODIUM SERPL-SCNC: 142 MMOL/L (ref 135–145)
WBC # BLD AUTO: 5 10E3/UL (ref 4–11)

## 2025-06-23 PROCEDURE — P9604 ONE-WAY ALLOW PRORATED TRIP: HCPCS | Mod: ORL | Performed by: NURSE PRACTITIONER

## 2025-06-23 PROCEDURE — 36415 COLL VENOUS BLD VENIPUNCTURE: CPT | Mod: ORL | Performed by: NURSE PRACTITIONER

## 2025-06-23 PROCEDURE — 80048 BASIC METABOLIC PNL TOTAL CA: CPT | Mod: ORL | Performed by: NURSE PRACTITIONER

## 2025-06-23 PROCEDURE — 85027 COMPLETE CBC AUTOMATED: CPT | Mod: ORL | Performed by: NURSE PRACTITIONER

## 2025-06-23 PROCEDURE — 83036 HEMOGLOBIN GLYCOSYLATED A1C: CPT | Mod: ORL | Performed by: NURSE PRACTITIONER

## 2025-06-28 DIAGNOSIS — K59.00 CONSTIPATION: Primary | ICD-10-CM

## 2025-06-28 PROBLEM — N18.32 CHRONIC KIDNEY DISEASE, STAGE 3B (H): Status: ACTIVE | Noted: 2025-06-28

## 2025-06-30 RX ORDER — MEDICAL SUPPLY, MISCELLANEOUS
EACH MISCELLANEOUS
Qty: 30 TABLET | Refills: 11 | Status: SHIPPED | OUTPATIENT
Start: 2025-06-30